# Patient Record
(demographics unavailable — no encounter records)

---

## 2024-11-19 NOTE — PHYSICAL EXAM
[de-identified] : See procedure [de-identified] : See procedure [Midline] : trachea located in midline position [Normal] : palpation of lymph nodes is normal

## 2024-11-19 NOTE — PHYSICAL EXAM
[de-identified] : See procedure [de-identified] : See procedure [Midline] : trachea located in midline position [Normal] : palpation of lymph nodes is normal

## 2024-11-19 NOTE — ASSESSMENT
[FreeTextEntry1] : Maddy is a pleasant 63 yo female with improved post nasal drip, nasal congestion on FFL.   Recommend flonase and ipratropium bromied nasal sprays.  Follow up in 3-6 months.

## 2024-11-19 NOTE — HISTORY OF PRESENT ILLNESS
[FreeTextEntry1] : Donovnareggie ID: [id number] Patient returns today c/o nasal congestion, postnasal drip. States that she is here today for checkup. Continues to Fluticasone with improvement.

## 2024-11-19 NOTE — PROCEDURE
[de-identified] : Laryngoscopy: Indication: post nasal drip Laryngoscopy: Procedure and Findings: Flexible Fiberoptic laryngoscopy consent was performed. The nares were topicalized with lidocaine 2% and oxymetazoline to each nare. The flexible scope was passed through left nares.  Findings: Nasal cavity: moderate edema, no masses, no bleeding Choana: post nasal drip Oropharynx: clear Vallecula: clear Larynx:  Epiglottis: sharp  Arytenoid: no prolapse  Aryepiglottic folds: no shortening  Vocal Folds: no edema  Vocal Fold Mobility: fully mobile  Subglottic triangle: patent Scope consistent with post nasal drip The patient tolerated the procedure well and without complications.

## 2024-11-19 NOTE — PROCEDURE
[de-identified] : Laryngoscopy: Indication: post nasal drip Laryngoscopy: Procedure and Findings: Flexible Fiberoptic laryngoscopy consent was performed. The nares were topicalized with lidocaine 2% and oxymetazoline to each nare. The flexible scope was passed through left nares.  Findings: Nasal cavity: moderate edema, no masses, no bleeding Choana: post nasal drip Oropharynx: clear Vallecula: clear Larynx:  Epiglottis: sharp  Arytenoid: no prolapse  Aryepiglottic folds: no shortening  Vocal Folds: no edema  Vocal Fold Mobility: fully mobile  Subglottic triangle: patent Scope consistent with post nasal drip The patient tolerated the procedure well and without complications.

## 2024-11-19 NOTE — REASON FOR VISIT
[Subsequent Evaluation] : a subsequent evaluation for [FreeTextEntry2] : nasal congestion, postnasal drip

## 2024-11-19 NOTE — HISTORY OF PRESENT ILLNESS
[FreeTextEntry1] : Donovanreggie ID: [id number] Patient returns today c/o nasal congestion, postnasal drip. States that she is here today for checkup. Continues to Fluticasone with improvement.

## 2025-02-06 NOTE — PHYSICAL EXAM
[de-identified] : BL knee: Inspection/palpation   Negative swelling, erythema, warmth  Negative tenderness to palpation   Negative crepitus    ROM:   Flexion 135 (normal 120-150)  Extension 0 (normal 0-15)   Tests:   Negative anterior drawer test   Negative Zulay's test  There is no varus or valgus instability  Quad strength is 5/5, hamstring strength is 5/5

## 2025-02-06 NOTE — HISTORY OF PRESENT ILLNESS
[FreeTextEntry1] : Ms. Marte is a 65-year-old female presenting to establish care for her bilateral knee pain. She is being referred by Orthopedics to undergo interventional treatments. She does also have osteoporosis. She is currently on Calcium daily. Her pain is around the medial aspect of the knees. Her pain is made worse when standing or walking for prolonged periods of time especially when going up or down the stairs. She describes the pain as throbbing, aching, stabbing. Her pain is present daily and rated at a 7/10 on the pain scale. Her pain limits her ADLs.

## 2025-02-06 NOTE — DISCUSSION/SUMMARY
[de-identified] : A discussion regarding available pain management treatment options occurred with the patient. These included interventional, rehabilitative, pharmacological, and alternative modalities. We will proceed with the following:   Interventional treatment options: 1. Proceed with a Bilateral Euflexxa injection x3.  The risks, benefits and alternatives of the proposed procedure were explained in detail with the patient.  The risks outlined include, but are not limited to, infection, bleeding, nerve injury, a temporary increase in pain, failure to resolve symptoms, allergic reaction, and possible elevation of blood sugar in diabetics.  All questions were answered to patient's apparent satisfaction and he/she verbalized an understanding.  - Follow up 2 weeks post injection  I Tnaia Pinto attest that this documentation has been prepared under the direction and in the presence of provider Dr. John Lan.  The documentation recorded by the scribe in my presence, accurately reflects the service I personally performed, and the decisions made by me with my edits as appropriate.   John Lan, DO

## 2025-02-19 NOTE — PROCEDURE
[Large Joint Injection] : Large joint injection [Bilateral] : bilaterally of the [Knee] : knee [Pain] : pain [X-ray evidence of Osteoarthritis on this or prior visit] : x-ray evidence of Osteoarthritis on this or prior visit [Alcohol] : alcohol [Ethyl Chloride sprayed topically] : ethyl chloride sprayed topically [Sterile technique used] : sterile technique used [Euflexxa(20mg)] : 20mg of Euflexxa [#2] : series #2 [] : Patient tolerated procedure well [Call if redness, pain or fever occur] : call if redness, pain or fever occur [Apply ice for 15min out of every hour for the next 12-24 hours as tolerated] : apply ice for 15 minutes out of every hour for the next 12-24 hours as tolerated [Previous OTC use and PT nontherapeutic] : patient has tried OTC's including aspirin, Ibuprofen, Aleve, etc or prescription NSAIDS, and/or exercises at home and/or physical therapy without satisfactory response [Patient had decreased mobility in the joint] : patient had decreased mobility in the joint [Risks, benefits, alternatives discussed / Verbal consent obtained] : the risks benefits, and alternatives have been discussed, and verbal consent was obtained [Visualization of the needle and placement of injection was performed without complication] : visualization of the needle and placement of injection was performed without complication [de-identified] : Chlorhexidine

## 2025-02-19 NOTE — PROCEDURE
[Large Joint Injection] : Large joint injection [Bilateral] : bilaterally of the [Knee] : knee [Pain] : pain [X-ray evidence of Osteoarthritis on this or prior visit] : x-ray evidence of Osteoarthritis on this or prior visit [Alcohol] : alcohol [Ethyl Chloride sprayed topically] : ethyl chloride sprayed topically [Sterile technique used] : sterile technique used [Euflexxa(20mg)] : 20mg of Euflexxa [#2] : series #2 [] : Patient tolerated procedure well [Call if redness, pain or fever occur] : call if redness, pain or fever occur [Apply ice for 15min out of every hour for the next 12-24 hours as tolerated] : apply ice for 15 minutes out of every hour for the next 12-24 hours as tolerated [Previous OTC use and PT nontherapeutic] : patient has tried OTC's including aspirin, Ibuprofen, Aleve, etc or prescription NSAIDS, and/or exercises at home and/or physical therapy without satisfactory response [Patient had decreased mobility in the joint] : patient had decreased mobility in the joint [Risks, benefits, alternatives discussed / Verbal consent obtained] : the risks benefits, and alternatives have been discussed, and verbal consent was obtained [Visualization of the needle and placement of injection was performed without complication] : visualization of the needle and placement of injection was performed without complication [de-identified] : Chlorhexidine

## 2025-02-26 NOTE — PROCEDURE
[Large Joint Injection] : Large joint injection [Bilateral] : bilaterally of the [Knee] : knee [Pain] : pain [X-ray evidence of Osteoarthritis on this or prior visit] : x-ray evidence of Osteoarthritis on this or prior visit [Alcohol] : alcohol [Ethyl Chloride sprayed topically] : ethyl chloride sprayed topically [Sterile technique used] : sterile technique used [Euflexxa(20mg)] : 20mg of Euflexxa [#3] : series #3 [] : Patient tolerated procedure well [Call if redness, pain or fever occur] : call if redness, pain or fever occur [Apply ice for 15min out of every hour for the next 12-24 hours as tolerated] : apply ice for 15 minutes out of every hour for the next 12-24 hours as tolerated [Previous OTC use and PT nontherapeutic] : patient has tried OTC's including aspirin, Ibuprofen, Aleve, etc or prescription NSAIDS, and/or exercises at home and/or physical therapy without satisfactory response [Patient had decreased mobility in the joint] : patient had decreased mobility in the joint [Risks, benefits, alternatives discussed / Verbal consent obtained] : the risks benefits, and alternatives have been discussed, and verbal consent was obtained [All ultrasound images have been permanently captured and stored accordingly in our picture archiving and communication system] : All ultrasound images have been permanently captured and stored accordingly in our picture archiving and communication system [Visualization of the needle and placement of injection was performed without complication] : visualization of the needle and placement of injection was performed without complication [de-identified] : Chlorhexidine

## 2025-02-26 NOTE — PROCEDURE
[Large Joint Injection] : Large joint injection [Bilateral] : bilaterally of the [Knee] : knee [Pain] : pain [X-ray evidence of Osteoarthritis on this or prior visit] : x-ray evidence of Osteoarthritis on this or prior visit [Alcohol] : alcohol [Ethyl Chloride sprayed topically] : ethyl chloride sprayed topically [Sterile technique used] : sterile technique used [Euflexxa(20mg)] : 20mg of Euflexxa [#3] : series #3 [] : Patient tolerated procedure well [Call if redness, pain or fever occur] : call if redness, pain or fever occur [Apply ice for 15min out of every hour for the next 12-24 hours as tolerated] : apply ice for 15 minutes out of every hour for the next 12-24 hours as tolerated [Previous OTC use and PT nontherapeutic] : patient has tried OTC's including aspirin, Ibuprofen, Aleve, etc or prescription NSAIDS, and/or exercises at home and/or physical therapy without satisfactory response [Patient had decreased mobility in the joint] : patient had decreased mobility in the joint [Risks, benefits, alternatives discussed / Verbal consent obtained] : the risks benefits, and alternatives have been discussed, and verbal consent was obtained [All ultrasound images have been permanently captured and stored accordingly in our picture archiving and communication system] : All ultrasound images have been permanently captured and stored accordingly in our picture archiving and communication system [Visualization of the needle and placement of injection was performed without complication] : visualization of the needle and placement of injection was performed without complication [de-identified] : Chlorhexidine

## 2025-07-18 NOTE — PHYSICAL EXAM
[de-identified] : R knee  No rashes, erythema, mild edema noted   TTP at medial joint line Stability: no gross instability  ROM: Painful ROM with flexion Gait: cautious, antalgic  L knee  No rashes, erythema, mild edema noted   TTP at medial joint line Stability: no gross instability  ROM: Painful ROM with flexion Gait: cautious, antalgic

## 2025-07-18 NOTE — PHYSICAL EXAM
[de-identified] : R knee  No rashes, erythema, mild edema noted   TTP at medial joint line Stability: no gross instability  ROM: Painful ROM with flexion Gait: cautious, antalgic  L knee  No rashes, erythema, mild edema noted   TTP at medial joint line Stability: no gross instability  ROM: Painful ROM with flexion Gait: cautious, antalgic

## 2025-07-18 NOTE — HISTORY OF PRESENT ILLNESS
[FreeTextEntry1] : Ms. Marte is a 65-year-old female presenting to establish care for her bilateral knee pain. She is being referred by Orthopedics to undergo interventional treatments. She does also have osteoporosis. She is currently on Calcium daily. Her pain is around the medial aspect of the knees. Her pain is made worse when standing or walking for prolonged periods of time especially when going up or down the stairs. She describes the pain as throbbing, aching, stabbing. Her pain is present daily and rated at a 7/10 on the pain scale. Her pain limits her ADLs.   7-: Revisit encounter.   She is presenting with a flare up of bilateral knee pain. Her pain continues to be in the medial aspect of the knees. Her pain is made worse with weight bearing activities. She rates the pain at a 7/10 on the pain scale. Her pain is made worse with weight bearing activities, especially when going up or down the stairs. She did undergo gel injections about 6 months ago with 50% relief for 5 months. She would like to repeat these injections.

## 2025-07-18 NOTE — DISCUSSION/SUMMARY
----- Message from Thom Menchaca MD sent at 4/27/2021  1:16 PM CDT -----  Please notify patient that his tests are similar to previous.  His kidney test has improved but he still has protein and blood in his urine.  Sodium level is low as before.  Cholesterol is good with somewhat elevated triglycerides as before.  Overall, tests are acceptable.  The blood and protein in the urine with evaluated previously by nephrologist, no need to follow-up with him at this time.  Will continue to monitor these tests at least yearly  
Patient notified of recent lab results. He had no further questions at this time.  
[de-identified] : A discussion regarding available pain management treatment options occurred with the patient. These included interventional, rehabilitative, pharmacological, and alternative modalities. We will proceed with the following:   Interventional treatment options: 1. Proceed with a Bilateral Euflexxa injection x3.  The risks, benefits and alternatives of the proposed procedure were explained in detail with the patient.  The risks outlined include, but are not limited to, infection, bleeding, nerve injury, a temporary increase in pain, failure to resolve symptoms, allergic reaction, and possible elevation of blood sugar in diabetics.  All questions were answered to patient's apparent satisfaction and he/she verbalized an understanding.  - Follow up after injections  I Tania Pinto attest that this documentation has been prepared under the direction and in the presence of provider Dr. John Lan.  The documentation recorded by the scribe in my presence, accurately reflects the service I personally performed, and the decisions made by me with my edits as appropriate.   John Lan, DO 
[de-identified] : A discussion regarding available pain management treatment options occurred with the patient. These included interventional, rehabilitative, pharmacological, and alternative modalities. We will proceed with the following:   Interventional treatment options: 1. Proceed with a Bilateral Euflexxa injection x3.  The risks, benefits and alternatives of the proposed procedure were explained in detail with the patient.  The risks outlined include, but are not limited to, infection, bleeding, nerve injury, a temporary increase in pain, failure to resolve symptoms, allergic reaction, and possible elevation of blood sugar in diabetics.  All questions were answered to patient's apparent satisfaction and he/she verbalized an understanding.  - Follow up after injections  I Tania Pinto attest that this documentation has been prepared under the direction and in the presence of provider Dr. John Lan.  The documentation recorded by the scribe in my presence, accurately reflects the service I personally performed, and the decisions made by me with my edits as appropriate.   John Lan, DO